# Patient Record
Sex: FEMALE | URBAN - METROPOLITAN AREA
[De-identification: names, ages, dates, MRNs, and addresses within clinical notes are randomized per-mention and may not be internally consistent; named-entity substitution may affect disease eponyms.]

---

## 2018-01-25 ENCOUNTER — NURSE TRIAGE (OUTPATIENT)
Dept: NURSING | Facility: CLINIC | Age: 63
End: 2018-01-25

## 2018-01-25 NOTE — TELEPHONE ENCOUNTER
Additional Information    Face swelling, bruise or pain    Protocols used: FACE INJURY-ADULT-AH

## 2018-01-25 NOTE — TELEPHONE ENCOUNTER
Deepthi called reporting that she tripped over a dog and fell on her face. Has a scuffed nose, turning black and blue, little swollen, sore to the touch, no concerns with breathing, no bleeding.